# Patient Record
Sex: FEMALE | Race: WHITE | NOT HISPANIC OR LATINO | Employment: OTHER | URBAN - METROPOLITAN AREA
[De-identification: names, ages, dates, MRNs, and addresses within clinical notes are randomized per-mention and may not be internally consistent; named-entity substitution may affect disease eponyms.]

---

## 2019-12-10 PROBLEM — M48.061 DEGENERATIVE LUMBAR SPINAL STENOSIS: Status: ACTIVE | Noted: 2019-12-10

## 2019-12-10 PROBLEM — M43.16 SPONDYLOLISTHESIS OF LUMBAR REGION: Status: ACTIVE | Noted: 2019-12-10

## 2019-12-10 PROBLEM — M16.12 PRIMARY OSTEOARTHRITIS OF LEFT HIP: Status: ACTIVE | Noted: 2019-12-10

## 2019-12-10 PROBLEM — M54.16 LUMBAR RADICULOPATHY, RIGHT: Status: ACTIVE | Noted: 2019-12-10

## 2021-01-12 DIAGNOSIS — Z23 NEED FOR VACCINATION: ICD-10-CM

## 2021-03-24 ENCOUNTER — HOSPITAL ENCOUNTER (OUTPATIENT)
Dept: RADIOLOGY | Facility: MEDICAL CENTER | Age: 81
End: 2021-03-24
Payer: MEDICARE

## 2021-03-24 ENCOUNTER — PATIENT OUTREACH (OUTPATIENT)
Dept: OTHER | Facility: MEDICAL CENTER | Age: 81
End: 2021-03-24

## 2021-03-24 NOTE — PROGRESS NOTES
Telephone call to pt at 0900 to introduce navigation and enquire about barriers to care.  0955 Pt returned call.  Pt denied questions and barriers.  Stated she does not have a need for transportation.  Pt confirmed she will come for her consult appointment on Friday.

## 2021-03-26 ENCOUNTER — HOSPITAL ENCOUNTER (OUTPATIENT)
Dept: RADIATION ONCOLOGY | Facility: MEDICAL CENTER | Age: 81
End: 2021-03-31
Attending: RADIOLOGY
Payer: MEDICARE

## 2021-03-26 VITALS
BODY MASS INDEX: 24.43 KG/M2 | HEART RATE: 86 BPM | TEMPERATURE: 98.2 F | OXYGEN SATURATION: 93 % | WEIGHT: 146.61 LBS | HEIGHT: 65 IN | SYSTOLIC BLOOD PRESSURE: 122 MMHG | DIASTOLIC BLOOD PRESSURE: 82 MMHG

## 2021-03-26 DIAGNOSIS — C34.32 PRIMARY CANCER OF LEFT LOWER LOBE OF LUNG (HCC): ICD-10-CM

## 2021-03-26 PROCEDURE — 99214 OFFICE O/P EST MOD 30 MIN: CPT | Performed by: RADIOLOGY

## 2021-03-26 PROCEDURE — 99205 OFFICE O/P NEW HI 60 MIN: CPT | Performed by: RADIOLOGY

## 2021-03-26 ASSESSMENT — PAIN SCALES - GENERAL: PAINLEVEL: NO PAIN

## 2021-03-26 ASSESSMENT — FIBROSIS 4 INDEX: FIB4 SCORE: 3.39

## 2021-03-26 NOTE — CT SIMULATION
PATIENT NAME Melina Sánchez   PRIMARY PHYSICIAN Helena Bennett [id number]   REFERRING PHYSICIAN Johan Fong M.D. 1940     Primary cancer of left lower lobe of lung (HCC)  Staging form: Lung, AJCC 8th Edition  - Clinical stage from 3/26/2021: Stage 0 (cTis, cN0, cM0) - Signed by Isaiah GALLEGO M.D. on 3/26/2021  Histopathologic type: Adenocarcinoma in situ, NOS  Stage prefix: Initial diagnosis  Laterality: Left  Diagnostic confirmation: Radiography and other imaging techniques without microscopic confirmation  Type of lung cancer: Resectable non-small cell lung cancer in inoperable patients treated with radiotherapy  Karnofsky performance status: Score 100  Symptoms: Absent         Treatment Planning CT Simulation      Order Questions     Question Answer Comment    Is this for a new course of treatment? Yes     Is this an Addendum? No     Implanted Device/Pacemaker Yes     Simulation Status Initial     Planned Start Date 4/6/2021     Treatment Site Lung - Lower lobe     Laterality Left     Treatment Technique SBRT     Treatment Pattern/Frequency Daily     Concurrent Chemotherapy No     CT Technique 3D      4D     Slice Thickness 2mm     Scan Extent Chest     Treatment Device(s) OmniBoard     Patient Attire Gown     Patient Position Supine     Patient Orientation Head First     Arm Position Up     Treatment Machine TB1 - STx     Image Guidance Match PTV - Soft Tissue      Bone     Fiducial Tracking Fluoro     Treatment Planning Image Fusion CT/CT      CT/PET     Special Physics Consult Stereotactic     Other Orders Special Tx Procedure     Release to patient Immediate     Note:  This was built due to interface errors that were caused by using the Epic released question

## 2021-03-26 NOTE — NON-PROVIDER
"Patient was seen today in clinic with Dr. Rebollar for consult.  Vitals signs and weight were obtained and pain assessment was completed.  Allergies and medications were reviewed with the patient.  Toxicities of treatment assessed.     Vitals/Pain:  Vitals:    03/26/21 1029   BP: 122/82   Pulse: 86   Temp: 36.8 °C (98.2 °F)   SpO2: 93%   Weight: 66.5 kg (146 lb 9.7 oz)   Height: 1.651 m (5' 5\")   Pain Score: No pain        Allergies:   Sulfa drugs    Current Medications:  Current Outpatient Medications   Medication Sig Dispense Refill   • ELIQUIS 5 MG Tab TK 1 T PO BID     • metFORMIN (GLUCOPHAGE) 500 MG Tab Take 500 mg by mouth every day.     • metoprolol (LOPRESSOR) 50 MG Tab Take 25 mg by mouth 2 times a day.     • flecainide (TAMBOCOR) 50 MG tablet Take 50 mg by mouth 2 times a day.     • aspirin (ASA) 81 MG Chew Tab chewable tablet Take 81 mg by mouth every day.     • azithromycin (ZITHROMAX) 250 MG Tab Take 2 tabs on day one, then one tab on days 2-5. (Patient not taking: Reported on 3/26/2021) 6 Tab 0   • citalopram (CELEXA) 20 MG Tab Take 20 mg by mouth every day.     • rosuvastatin (CRESTOR) 20 MG Tab Take 20 mg by mouth every evening.       No current facility-administered medications for this encounter.         PCP:  Donald Conley, David Ass't  "

## 2021-03-26 NOTE — CONSULTS
RADIATION ONCOLOGY CONSULT    DATE OF SERVICE: 3/26/2021    IDENTIFICATION: A 80 y.o. female with   Primary cancer of left lower lobe of lung (HCC)  Staging form: Lung, AJCC 8th Edition  - Clinical stage from 3/26/2021: Stage 0 (cTis, cN0, cM0) - Signed by Isaiah GALLEGO M.D. on 3/26/2021  Histopathologic type: Adenocarcinoma in situ, NOS  Stage prefix: Initial diagnosis  Laterality: Left  Diagnostic confirmation: Radiography and other imaging techniques without microscopic confirmation  Type of lung cancer: Resectable non-small cell lung cancer in inoperable patients treated with radiotherapy  Karnofsky performance status: Score 100  Symptoms: Absent        She is here at the kind request of Dr. Fong to discuss stereotactic radiotherapy.    HISTORY OF PRESENT ILLNESS: 80-year-old female, former smoker, 25-pack-year tobacco history stopped in 1986.She presents with enlarging left lower lobe mass.  In May 2020 it was noted to be 1 cm in size.  In February 2021 and increased to 2 cm in size.PET CT scan showed metabolic activity with an SUV of 3.3.Bronchoscopy was performed 3/12/2021. Pathology demonstrated bronchial epithelial cells and alveolar macrophages with no evidence of malignancy on both washings and brushings.    Patient was felt to be a poor candidate for surgical resection.  Considering the growth in tumor and metabolic activity she has been referred here for empiric stereotactic radiotherapy.    She is currently asymptomatic.    PROBLEM LIST:  Patient Active Problem List   Diagnosis   • Degenerative lumbar spinal stenosis   • Spondylolisthesis of lumbar region   • Primary osteoarthritis of left hip   • Lumbar radiculopathy, right   • Primary cancer of left lower lobe of lung (HCC)        PAST SURGICAL HISTORY:  Past Surgical History:   Procedure Laterality Date   • PACEMAKER INSERTION  2009   • STENT PLACEMENT  2007   • APPENDECTOMY     • PRIMARY C SECTION         CURRENT MEDICATIONS:  Current  "Outpatient Medications   Medication Sig Dispense Refill   • ELIQUIS 5 MG Tab TK 1 T PO BID     • metFORMIN (GLUCOPHAGE) 500 MG Tab Take 500 mg by mouth every day.     • metoprolol (LOPRESSOR) 50 MG Tab Take 25 mg by mouth 2 times a day.     • flecainide (TAMBOCOR) 50 MG tablet Take 50 mg by mouth 2 times a day.     • aspirin (ASA) 81 MG Chew Tab chewable tablet Take 81 mg by mouth every day.     • azithromycin (ZITHROMAX) 250 MG Tab Take 2 tabs on day one, then one tab on days 2-5. (Patient not taking: Reported on 3/26/2021) 6 Tab 0   • citalopram (CELEXA) 20 MG Tab Take 20 mg by mouth every day.     • rosuvastatin (CRESTOR) 20 MG Tab Take 20 mg by mouth every evening.       No current facility-administered medications for this encounter.       ALLERGIES:    Sulfa drugs    FAMILY HISTORY:    family history includes Cancer in her brother and paternal grandmother.    SOCIAL HISTORY:     reports that she quit smoking about 35 years ago. Her smoking use included cigarettes. She has a 10.00 pack-year smoking history. She has never used smokeless tobacco. She reports current alcohol use of about 0.6 oz of alcohol per week. She reports that she does not use drugs.    REVIEW OF SYSTEMS:    A complete review of systems taken. Pertinent items in HPI.     PHYSICAL EXAM:    PERFORMANCE STATUS:  No flowsheet data found.  Karnofsky Score 3/26/2021   Karnofsky Score 80   Some recent data might be hidden     /82   Pulse 86   Temp 36.8 °C (98.2 °F)   Ht 1.651 m (5' 5\")   Wt 66.5 kg (146 lb 9.7 oz)   SpO2 93%   BMI 24.40 kg/m²   Physical Exam  Constitutional:       Appearance: Normal appearance.   HENT:      Head: Normocephalic.   Cardiovascular:      Rate and Rhythm: Normal rate and regular rhythm.      Heart sounds: Normal heart sounds.   Pulmonary:      Effort: Pulmonary effort is normal.      Breath sounds: Normal breath sounds.   Abdominal:      General: Abdomen is flat.   Musculoskeletal:      Right lower leg: No " edema.      Left lower leg: No edema.   Skin:     General: Skin is warm and dry.   Neurological:      General: No focal deficit present.      Mental Status: She is alert.   Psychiatric:         Mood and Affect: Mood normal.         Behavior: Behavior normal.         Thought Content: Thought content normal.         Judgment: Judgment normal.          LABORATORY DATA:   Lab Results   Component Value Date/Time    WBC 9.2 12/15/2020 01:05 PM    RBC 4.24 12/15/2020 01:05 PM    HEMOGLOBIN 13.4 12/15/2020 01:05 PM    HEMATOCRIT 42.0 12/15/2020 01:05 PM    MCV 99.1 (H) 12/15/2020 01:05 PM    MCH 31.6 (H) 12/15/2020 01:05 PM    MCHC 31.9 (L) 12/15/2020 01:05 PM    RDW 13.0 12/15/2020 01:05 PM    PLATELETCT 125 (L) 12/15/2020 01:05 PM    MPV 10.2 12/15/2020 01:05 PM      Lab Results   Component Value Date/Time    SODIUM 140 12/15/2020 01:05 PM    POTASSIUM 4.2 12/15/2020 01:05 PM    CHLORIDE 105 12/15/2020 01:05 PM    CO2 26 12/15/2020 01:05 PM    GLUCOSE 119 (H) 12/15/2020 01:05 PM    BUN 27 (H) 12/15/2020 01:05 PM    CREATININE 1.5 (H) 12/15/2020 01:05 PM           RADIOLOGY DATA:      IMPRESSION:    A 80 y.o. with  Primary cancer of left lower lobe of lung (HCC)  Staging form: Lung, AJCC 8th Edition  - Clinical stage from 3/26/2021: Stage 0 (cTis, cN0, cM0) - Signed by Isaiah GALLEGO M.D. on 3/26/2021  Histopathologic type: Adenocarcinoma in situ, NOS  Stage prefix: Initial diagnosis  Laterality: Left  Diagnostic confirmation: Radiography and other imaging techniques without microscopic confirmation  Type of lung cancer: Resectable non-small cell lung cancer in inoperable patients treated with radiotherapy  Karnofsky performance status: Score 100  Symptoms: Absent        RECOMMENDATIONS:   Reviewed imaging with patient and her son who accompanied her today.  We discussed the pros and cons of empiric stereotactic based therapy for what appears to be an adenocarcinoma in situ of the left lower lobe.Her alternative  options include continued observation; however, given the growth of the lesion patient is very reticent.  We also discussed surgical resection but she felt uncomfortable with potential risks associated with surgery.    With stereotactic based therapy we will not have direct evidence that this is cancer however using radiographic criteria, I feel fairly confident that were dealing with a low-grade malignancy.  In addition, stereotactic radiotherapy would provide very high local control rates greater than 90% with very little to no adverse effects.    After discussions she would like to proceed with stereotactic radiotherapy which will involve delivering 6000 cGy in 5 fractions given every other day over 1.5 weeks to the left lower lobe mass.  She will return for simulation on March 30 with treatment anticipated to start April 6 and complete by April 16.    Thank you for the opportunity to participate in her care.  If any questions or comments, please do not hesitate in calling.    Orders Placed This Encounter   • Rad Onc Treatment Planning CT Simulation

## 2021-03-30 ENCOUNTER — HOSPITAL ENCOUNTER (OUTPATIENT)
Dept: RADIATION ONCOLOGY | Facility: MEDICAL CENTER | Age: 81
End: 2021-03-30

## 2021-03-30 PROCEDURE — 77263 THER RADIOLOGY TX PLNG CPLX: CPT | Performed by: RADIOLOGY

## 2021-03-30 PROCEDURE — 77290 THER RAD SIMULAJ FIELD CPLX: CPT | Mod: 26 | Performed by: RADIOLOGY

## 2021-03-30 PROCEDURE — 77290 THER RAD SIMULAJ FIELD CPLX: CPT | Performed by: RADIOLOGY

## 2021-03-30 PROCEDURE — 77470 SPECIAL RADIATION TREATMENT: CPT | Performed by: RADIOLOGY

## 2021-03-30 PROCEDURE — 77334 RADIATION TREATMENT AID(S): CPT | Mod: 26 | Performed by: RADIOLOGY

## 2021-03-30 PROCEDURE — 77334 RADIATION TREATMENT AID(S): CPT | Performed by: RADIOLOGY

## 2021-03-30 PROCEDURE — 77470 SPECIAL RADIATION TREATMENT: CPT | Mod: 26 | Performed by: RADIOLOGY

## 2021-03-31 ENCOUNTER — PATIENT MESSAGE (OUTPATIENT)
Dept: HEALTH INFORMATION MANAGEMENT | Facility: OTHER | Age: 81
End: 2021-03-31

## 2021-03-31 ENCOUNTER — PATIENT OUTREACH (OUTPATIENT)
Dept: OTHER | Facility: MEDICAL CENTER | Age: 81
End: 2021-03-31

## 2021-03-31 PROCEDURE — 77370 RADIATION PHYSICS CONSULT: CPT | Performed by: RADIOLOGY

## 2021-03-31 NOTE — PROGRESS NOTES
"Cancer Nurse Navigation Assessment/follow up:      Assessment/Discussion:    Barriers to Care:  TRANSPORTATION:  Pt will drive herself.  She is a little concerned that she may be too tired to drive.  Reviewed side effects of radiation and that her driving should be ok.  EMPLOYMENT: Pt retired  FINANCIAL: Pt denies current financial stressors  INSURANCE:  pt insured  SUPPORT SYSTEM:  No one local.  Pt reported moving just prior to start of COVID and has not had opportunity to meet anyone in the area.  Pt reports does have family for support as well as her friends.  PSYCHOLOGICAL:  Pt very anxious regarding \"cancer diagnosis\".  Pt stating has had difficulty with sleeping past 2-3 weeks related to this.  Discussed with patient benefit of counseling, support groups as well as guided imagery(mind body techniques) classes.  Pt not sure but open to navigator sending information.  Talked to patient about bedtime routines and ways to aid sleep.  Encouraged her to discuss with physician if continues to be a concern and affect her quality of life.  Pt also reporting difficulty with remaining still when radiation mapping was done. Will discuss with therapists to see if this may be a barrier to first treatment.     DST: previously administered  COMMUNICATION:   no barriers  FAMILY CARE: none identified  SELF CARE:  no barriers identified    Resources Provided/Intervention:  Will mail information on counseling, support groups and guided imagery.  Provided additional education regarding treatment and what to expect.  Will send contact information via [employer] per her request.    Outcome:  Pt reports feeling a little better after discussion with navigator.          "

## 2021-04-01 ENCOUNTER — HOSPITAL ENCOUNTER (OUTPATIENT)
Dept: RADIATION ONCOLOGY | Facility: MEDICAL CENTER | Age: 81
End: 2021-04-30
Attending: RADIOLOGY
Payer: MEDICARE

## 2021-04-01 NOTE — RADIATION PLANNING NOTES
DATE OF SERVICE: 3/30/2021    DIAGNOSIS:  Primary cancer of left lower lobe of lung (HCC)  Staging form: Lung, AJCC 8th Edition  - Clinical stage from 3/26/2021: Stage 0 (cTis, cN0, cM0) - Signed by Isaiah GALLEGO M.D. on 3/26/2021  Histopathologic type: Adenocarcinoma in situ, NOS  Stage prefix: Initial diagnosis  Laterality: Left  Diagnostic confirmation: Radiography and other imaging techniques without microscopic confirmation  Type of lung cancer: Resectable non-small cell lung cancer in inoperable patients treated with radiotherapy  Karnofsky performance status: Score 100  Symptoms: Absent       DATE OF SERVICE: 3/30/2021    TYPE OF SIMULATION: SBRT    GOAL OF TREATMENT:   [] Curative  [] Palliative  [] Oligometastatic    CONTRAST:    [] IV Contrast*  [] Oral Contrast               POSITION:    []  Supine  [] Prone     IMMOBILIZATION DEVICE: []  Body-Fix  [] Omniboard  []  Bellyboard    PROCEDURE: Patient placed in immobilization device. 4D gated and non-gated CT obtained to assess organ motion.  Images viewed and approved.  Images reconstructed as need.  Image data sets transferred to [employer] for planning.    I have personally reviewed the relevant data, performed the target localization, and determined all relevant factors for this patient’s simulation.    *Omnipaque 80 -100cc IVP in conjunction with 500cc NS

## 2021-04-01 NOTE — RADIATION PLANNING NOTES
Clinical Treatment Planning Note    DATE OF SERVICE: 3/30/2021    DIAGNOSIS:  Primary cancer of left lower lobe of lung (HCC)  Staging form: Lung, AJCC 8th Edition  - Clinical stage from 3/26/2021: Stage 0 (cTis, cN0, cM0) - Signed by Isaiah GALLEGO M.D. on 3/26/2021  Histopathologic type: Adenocarcinoma in situ, NOS  Stage prefix: Initial diagnosis  Laterality: Left  Diagnostic confirmation: Radiography and other imaging techniques without microscopic confirmation  Type of lung cancer: Resectable non-small cell lung cancer in inoperable patients treated with radiotherapy  Karnofsky performance status: Score 100  Symptoms: Absent         IMAGING REVIEWED:  [x] CT     [] MRI     [x] PET/CT     [] BONE SCAN     [] MAMMO     [] OTHER      TREATMENT INTENT:   [x] CURATIVE     [] MAINTENANCE     []  PALLIATIVE      []  SUPPORTIVE     []  PROPHYLACTIC     [] BENIGN     []  CONSOLIDATIVE      [] DEFINITIVE   []  OLOGIMETASTATIC      LINE OF TREATMENT:  [] ADJUVANT   [x] DEFINITIVE   [] NEOADJUVANT   [] RE-TREATMENT      TECHNIQUE PLANNED:  [] IMRT   [] 3D   [x] SBRT   [] SRS/SRT   [] HDR   [] ELECTRON       IMRT JUSTIFICATION:  []   An immediately adjacent area has been previously irradiated and abutting portals must be established with high precision.    []  Dose escalation is planned to deliver radiation doses in excess of those commonly utilized for similar tumors with conventional treatment.    []  The target volume is concave or convex, and the critical normal tissues are within or around that convexity or concavity.    []  The target volume is in close proximity to critical structures that must be protected.    []  The volume of interest must be covered with narrow margins to adequately protect  immediately adjacent structures.      FIELDS & BLOCKING:  [] COMPLEX BLOCKS     []  = 3 TX AREAS     [x]  ARCS     []  CUSTOM SHEILD        []  SIMPLE BLOCK      CHEMOTHERAPY:  []  CONCURRENT     []  INDUCTION     []  SEQUENTIAL     []  <30 DAYS FROM XRT      NOTES:  OAR CONSTRAINTS: (GUIDELINES ONLY NOT ABSOLUTE)   QUANTEC OR AS STATED  Critical Structure Dose/fx Volume Dose Max Dose Protocol   Brachial Plexus 10-12 Gy x5 3 cc 6 Gy/fx  813   Brachial Plexus 10-12 Gy x5   6.4 Gy/fx 813   Brachial Plexus 20 Gy x3   8 Gy/fx 618   Bronchus/Trachea 10-12 Gy x5 4cc 3.6 Gy/fx 105%    Bronchus/Trachea 10-12 Gy x5   105%    Bronchus/Trachea 20 Gy x3   8 Gy/fx 618   Esophagus, nonadjacent wall 10-12 Gy x5 5 cc 5.5 Gy/fx  813   Esophagus, nonadjacent wall 10-12 Gy x5   105%    Esophagus 20 Gy x3   9 Gy/fx 618   Great Vessels 10-12 Gy x5 10 cc 9.4 Gy/fx  813   Great Vessels 10-12 Gy x5   105%    Heart 10-12 Gy x5 15 cc 5.5 Gy/fx  813   Heart 10-12 Gy x5   105%    Heart 20 Gy x3   8 Gy/fx 618   Lungs, total 10-12 Gy x5 1500 cc 2.5 Gy/fx  813   Lungs, total 10-12 Gy x5 1000 cc 2.7 Gy/fx  813   Lungs, total 20 Gy x3 V20 10% 8 Gy/fx 618   Skin 10-12 Gy x5 10 cc 6 Gy/fx  813   Skin 10-12 Gy x5   6.4 Gy/fx 813   Skin 20 Gy x3   8 Gy/fx 618   Spinal Cord 10-12 Gy x5 0.25 cc 4.5 Gy/fx  813   Spinal Cord 10-12 Gy x5 0.5 cc 2.7 Gy/fx  813   Spinal Cord 10-12 Gy x5   6 Gy/fx 813   Spinal Cord 12 Gy x4 0.35 cc 5.2 Gy/fx  915   Spinal Cord 12 Gy x4 1.2 cc 3.4 Gy/fx  915   Spinal Cord 20 Gy x3   6 Gy/fx 618

## 2021-04-01 NOTE — RADIATION PLANNING NOTES
PATIENT NAME Melina Sánchez   PRIMARY PHYSICIAN Helena Bennett [place] [id number]   REFERRING PHYSICIAN No ref. provider found 1940     DATE OF SERVICE: 3/30/2021    DIAGNOSIS:  Primary cancer of left lower lobe of lung (HCC)  Staging form: Lung, AJCC 8th Edition  - Clinical stage from 3/26/2021: Stage 0 (cTis, cN0, cM0) - Signed by Isaiah GALLEGO M.D. on 3/26/2021  Histopathologic type: Adenocarcinoma in situ, NOS  Stage prefix: Initial diagnosis  Laterality: Left  Diagnostic confirmation: Radiography and other imaging techniques without microscopic confirmation  Type of lung cancer: Resectable non-small cell lung cancer in inoperable patients treated with radiotherapy  Karnofsky performance status: Score 100  Symptoms: Absent         SPECIAL TREATMENT PROCEDURE NOTE:  Considerable additional effort required in the management of this case because of administration of Stereotactic Radiotherapy, which may result in increased normal tissue toxicity and require greater effort in contouring and treatment because of greater precision.

## 2021-04-02 PROCEDURE — 77300 RADIATION THERAPY DOSE PLAN: CPT | Mod: 26 | Performed by: RADIOLOGY

## 2021-04-02 PROCEDURE — 77334 RADIATION TREATMENT AID(S): CPT | Performed by: RADIOLOGY

## 2021-04-02 PROCEDURE — 77293 RESPIRATOR MOTION MGMT SIMUL: CPT | Mod: 26 | Performed by: RADIOLOGY

## 2021-04-02 PROCEDURE — 77295 3-D RADIOTHERAPY PLAN: CPT | Mod: 26 | Performed by: RADIOLOGY

## 2021-04-02 PROCEDURE — 77295 3-D RADIOTHERAPY PLAN: CPT | Performed by: RADIOLOGY

## 2021-04-02 PROCEDURE — 77293 RESPIRATOR MOTION MGMT SIMUL: CPT | Performed by: RADIOLOGY

## 2021-04-02 PROCEDURE — 77334 RADIATION TREATMENT AID(S): CPT | Mod: 26 | Performed by: RADIOLOGY

## 2021-04-02 PROCEDURE — 77300 RADIATION THERAPY DOSE PLAN: CPT | Performed by: RADIOLOGY

## 2021-04-06 ENCOUNTER — PATIENT OUTREACH (OUTPATIENT)
Dept: OTHER | Facility: MEDICAL CENTER | Age: 81
End: 2021-04-06

## 2021-04-06 ENCOUNTER — HOSPITAL ENCOUNTER (OUTPATIENT)
Dept: RADIATION ONCOLOGY | Facility: MEDICAL CENTER | Age: 81
End: 2021-04-06

## 2021-04-06 LAB
CHEMOTHERAPY INFUSION START DATE: NORMAL
CHEMOTHERAPY RECORDS: 12
CHEMOTHERAPY RECORDS: 6000
CHEMOTHERAPY RECORDS: NORMAL
CHEMOTHERAPY RX CANCER: NORMAL
DATE 1ST CHEMO CANCER: NORMAL
RAD ONC ARIA COURSE LAST TREATMENT DATE: NORMAL
RAD ONC ARIA COURSE TREATMENT ELAPSED DAYS: NORMAL
RAD ONC ARIA REFERENCE POINT DOSAGE GIVEN TO DATE: 12
RAD ONC ARIA REFERENCE POINT DOSAGE GIVEN TO DATE: 12.56
RAD ONC ARIA REFERENCE POINT ID: NORMAL
RAD ONC ARIA REFERENCE POINT ID: NORMAL
RAD ONC ARIA REFERENCE POINT SESSION DOSAGE GIVEN: 12
RAD ONC ARIA REFERENCE POINT SESSION DOSAGE GIVEN: 12.56

## 2021-04-06 PROCEDURE — 77280 THER RAD SIMULAJ FIELD SMPL: CPT | Mod: 26 | Performed by: RADIOLOGY

## 2021-04-06 PROCEDURE — 77373 STRTCTC BDY RAD THER TX DLVR: CPT | Performed by: RADIOLOGY

## 2021-04-06 PROCEDURE — 77435 SBRT MANAGEMENT: CPT | Performed by: RADIOLOGY

## 2021-04-06 PROCEDURE — 77280 THER RAD SIMULAJ FIELD SMPL: CPT | Performed by: RADIOLOGY

## 2021-04-06 NOTE — PROCEDURES
DATE OF SERVICE: 4/6/2021    DIAGNOSIS:  Primary cancer of left lower lobe of lung (HCC)  Staging form: Lung, AJCC 8th Edition  - Clinical stage from 3/26/2021: Stage 0 (cTis, cN0, cM0) - Signed by Isaiah GALLEGO M.D. on 3/26/2021  Histopathologic type: Adenocarcinoma in situ, NOS  Stage prefix: Initial diagnosis  Laterality: Left  Diagnostic confirmation: Radiography and other imaging techniques without microscopic confirmation  Type of lung cancer: Resectable non-small cell lung cancer in inoperable patients treated with radiotherapy  Karnofsky performance status: Score 100  Symptoms: Absent       TREATMENT:  Radiation Therapy Episodes     Active Episodes     Radiation Therapy: SBRT (4/6/2021)               Radiation Treatments       Plan Last Treated On Elapsed Days Fractions Treated Prescribed Fraction Dose (cGy) Prescribed Total Dose (cGy)    L Lung SBRT 4/6/2021 0 @ [id number] 1 of 5 1,200 6,000                Reference Point Last Treated On Elapsed Days Most Recent Session Dose (cGy) Total Dose (cGy)    ITV 4/6/2021 0 @ [id number] 1,200 1,200    L Lung cp 4/6/2021 0 @ [id number] 1,256 1,256                      STEREOTACTIC PROCEDURE NOTE:    Called by [employer] machine to verify treatment parameters including:  treatment site, treatment dose, and treatment setup prior to stereotactic treatment..    Patient was placed in the treatment position with use of immobilization device and  laser guidance. CBCT images were acquired for target localization.  Images were reviewed in the axial, coronal, and saggital views and shifts were made as necessary to ensure that patient position matched simulation position.      Treatment delivered per  prescription.  The medical physicist was present throughout the set-up, verification and treatment delivery to oversee the procedure and ensure all parameters agreed with the computerized plan.    I have personally reviewed the relevant data, performed the target  localization, and determined all relevant factors for this patient’s simulation.

## 2021-04-08 ENCOUNTER — HOSPITAL ENCOUNTER (OUTPATIENT)
Dept: RADIATION ONCOLOGY | Facility: MEDICAL CENTER | Age: 81
End: 2021-04-08

## 2021-04-08 LAB
CHEMOTHERAPY INFUSION START DATE: NORMAL
CHEMOTHERAPY RECORDS: 12
CHEMOTHERAPY RECORDS: 6000
CHEMOTHERAPY RECORDS: NORMAL
CHEMOTHERAPY RX CANCER: NORMAL
DATE 1ST CHEMO CANCER: NORMAL
RAD ONC ARIA COURSE LAST TREATMENT DATE: NORMAL
RAD ONC ARIA COURSE TREATMENT ELAPSED DAYS: NORMAL
RAD ONC ARIA REFERENCE POINT DOSAGE GIVEN TO DATE: 24
RAD ONC ARIA REFERENCE POINT DOSAGE GIVEN TO DATE: 25.12
RAD ONC ARIA REFERENCE POINT ID: NORMAL
RAD ONC ARIA REFERENCE POINT ID: NORMAL
RAD ONC ARIA REFERENCE POINT SESSION DOSAGE GIVEN: 12
RAD ONC ARIA REFERENCE POINT SESSION DOSAGE GIVEN: 12.56

## 2021-04-08 PROCEDURE — 77373 STRTCTC BDY RAD THER TX DLVR: CPT | Performed by: RADIOLOGY

## 2021-04-08 PROCEDURE — 77280 THER RAD SIMULAJ FIELD SMPL: CPT | Mod: 26 | Performed by: RADIOLOGY

## 2021-04-08 PROCEDURE — 77280 THER RAD SIMULAJ FIELD SMPL: CPT | Performed by: RADIOLOGY

## 2021-04-08 NOTE — PROCEDURES
DATE OF SERVICE: 4/8/2021    DIAGNOSIS:  Primary cancer of left lower lobe of lung (HCC)  Staging form: Lung, AJCC 8th Edition  - Clinical stage from 3/26/2021: Stage 0 (cTis, cN0, cM0) - Signed by Isaiah GALLEGO M.D. on 3/26/2021  Histopathologic type: Adenocarcinoma in situ, NOS  Stage prefix: Initial diagnosis  Laterality: Left  Diagnostic confirmation: Radiography and other imaging techniques without microscopic confirmation  Type of lung cancer: Resectable non-small cell lung cancer in inoperable patients treated with radiotherapy  Karnofsky performance status: Score 100  Symptoms: Absent       TREATMENT:  Radiation Therapy Episodes     Active Episodes     Radiation Therapy: SBRT (4/6/2021)               Radiation Treatments       Plan Last Treated On Elapsed Days Fractions Treated Prescribed Fraction Dose (cGy) Prescribed Total Dose (cGy)    L Lung SBRT 4/8/2021 2 @ [id number] 2 of 5 1,200 6,000                Reference Point Last Treated On Elapsed Days Most Recent Session Dose (cGy) Total Dose (cGy)    ITV 4/8/2021 2 @ [id number] 1,200 2,400    L Lung cp 4/8/2021 2 @ [id number] 1,256 2,512                      STEREOTACTIC PROCEDURE NOTE:    Called by [email or web address] machine to verify treatment parameters including:  treatment site, treatment dose, and treatment setup prior to stereotactic treatment..    Patient was placed in the treatment position with use of immobilization device and  laser guidance. CBCT images were acquired for target localization.  Images were reviewed in the axial, coronal, and saggital views and shifts were made as necessary to ensure that patient position matched simulation position.      Treatment delivered per  prescription.  The medical physicist was present throughout the set-up, verification and treatment delivery to oversee the procedure and ensure all parameters agreed with the computerized plan.    I have personally reviewed the relevant data, performed the target  localization, and determined all relevant factors for this patient’s simulation.

## 2021-04-12 ENCOUNTER — HOSPITAL ENCOUNTER (OUTPATIENT)
Dept: RADIATION ONCOLOGY | Facility: MEDICAL CENTER | Age: 81
End: 2021-04-12
Payer: MEDICARE

## 2021-04-12 LAB
CHEMOTHERAPY INFUSION START DATE: NORMAL
CHEMOTHERAPY RECORDS: 12
CHEMOTHERAPY RECORDS: 6000
CHEMOTHERAPY RECORDS: NORMAL
CHEMOTHERAPY RX CANCER: NORMAL
DATE 1ST CHEMO CANCER: NORMAL
RAD ONC ARIA COURSE LAST TREATMENT DATE: NORMAL
RAD ONC ARIA COURSE TREATMENT ELAPSED DAYS: NORMAL
RAD ONC ARIA REFERENCE POINT DOSAGE GIVEN TO DATE: 36
RAD ONC ARIA REFERENCE POINT DOSAGE GIVEN TO DATE: 37.68
RAD ONC ARIA REFERENCE POINT ID: NORMAL
RAD ONC ARIA REFERENCE POINT ID: NORMAL
RAD ONC ARIA REFERENCE POINT SESSION DOSAGE GIVEN: 12
RAD ONC ARIA REFERENCE POINT SESSION DOSAGE GIVEN: 12.56

## 2021-04-12 PROCEDURE — 77336 RADIATION PHYSICS CONSULT: CPT | Mod: XU | Performed by: RADIOLOGY

## 2021-04-12 PROCEDURE — 77280 THER RAD SIMULAJ FIELD SMPL: CPT | Mod: 26 | Performed by: RADIOLOGY

## 2021-04-12 PROCEDURE — 77280 THER RAD SIMULAJ FIELD SMPL: CPT | Performed by: RADIOLOGY

## 2021-04-12 PROCEDURE — 77373 STRTCTC BDY RAD THER TX DLVR: CPT | Performed by: RADIOLOGY

## 2021-04-14 ENCOUNTER — HOSPITAL ENCOUNTER (OUTPATIENT)
Dept: RADIATION ONCOLOGY | Facility: MEDICAL CENTER | Age: 81
End: 2021-04-14
Payer: MEDICARE

## 2021-04-14 VITALS
WEIGHT: 149.91 LBS | HEART RATE: 84 BPM | BODY MASS INDEX: 24.95 KG/M2 | SYSTOLIC BLOOD PRESSURE: 130 MMHG | TEMPERATURE: 97.2 F | OXYGEN SATURATION: 95 % | DIASTOLIC BLOOD PRESSURE: 83 MMHG

## 2021-04-14 DIAGNOSIS — C34.90 MALIGNANT NEOPLASM OF UNSPECIFIED PART OF UNSPECIFIED BRONCHUS OR LUNG (HCC): ICD-10-CM

## 2021-04-14 DIAGNOSIS — C34.32 PRIMARY CANCER OF LEFT LOWER LOBE OF LUNG (HCC): ICD-10-CM

## 2021-04-14 LAB
CHEMOTHERAPY INFUSION START DATE: NORMAL
CHEMOTHERAPY RECORDS: 12
CHEMOTHERAPY RECORDS: 6000
CHEMOTHERAPY RECORDS: NORMAL
CHEMOTHERAPY RX CANCER: NORMAL
DATE 1ST CHEMO CANCER: NORMAL
RAD ONC ARIA COURSE LAST TREATMENT DATE: NORMAL
RAD ONC ARIA COURSE TREATMENT ELAPSED DAYS: NORMAL
RAD ONC ARIA REFERENCE POINT DOSAGE GIVEN TO DATE: 48
RAD ONC ARIA REFERENCE POINT DOSAGE GIVEN TO DATE: 50.24
RAD ONC ARIA REFERENCE POINT ID: NORMAL
RAD ONC ARIA REFERENCE POINT ID: NORMAL
RAD ONC ARIA REFERENCE POINT SESSION DOSAGE GIVEN: 12
RAD ONC ARIA REFERENCE POINT SESSION DOSAGE GIVEN: 12.56

## 2021-04-14 PROCEDURE — 77373 STRTCTC BDY RAD THER TX DLVR: CPT | Performed by: RADIOLOGY

## 2021-04-14 PROCEDURE — 77280 THER RAD SIMULAJ FIELD SMPL: CPT | Mod: 26 | Performed by: RADIOLOGY

## 2021-04-14 PROCEDURE — 77280 THER RAD SIMULAJ FIELD SMPL: CPT | Performed by: RADIOLOGY

## 2021-04-14 ASSESSMENT — FIBROSIS 4 INDEX: FIB4 SCORE: 3.39

## 2021-04-14 ASSESSMENT — PAIN SCALES - GENERAL: PAINLEVEL: NO PAIN

## 2021-04-14 NOTE — ON TREATMENT VISIT
ON TREATMENT  NOTE  RADIATION ONCOLOGY DEPARTMENT    Patient name:  Melina Sánchez    Primary Physician:  Helena Bennett D.O. MRN: [id number]  [facility]: [id number]   Referring physician:  Johan Fong M.D. : 1940, 80 y.o.     ENCOUNTER DATE:  2021      DIAGNOSIS:  Primary cancer of left lower lobe of lung (HCC)  Staging form: Lung, AJCC 8th Edition  - Clinical stage from 3/26/2021: Stage 0 (cTis, cN0, cM0) - Signed by Isaiah GALLEGO M.D. on 3/26/2021  Histopathologic type: Adenocarcinoma in situ, NOS  Stage prefix: Initial diagnosis  Laterality: Left  Diagnostic confirmation: Radiography and other imaging techniques without microscopic confirmation  Type of lung cancer: Resectable non-small cell lung cancer in inoperable patients treated with radiotherapy  Karnofsky performance status: Score 100  Symptoms: Absent      TREATMENT SUMMARY:  Aria Treatment Information        Some values may be hidden. Unless noted otherwise, only the newest values recorded on each date are displayed.         Aria Treatment Summary 21   Course First Treatment Date 2021   Course Last Treatment Date 2021   L Lung SBRT Plan from Course C1_SBRT L lung   Fraction 4 of 5   Elapsed Course Days  @ [id number]   Prescribed Fraction Dose 1,200 cGy   Prescribed Total Dose 6,000 cGy   ITV Reference Point from Course C1_SBRT L lung   Elapsed Course Days  @ [id number]   Session Dose 1,200 cGy   Total Dose 4,800 cGy   L Lung cp Reference Point from Course C1_SBRT L lung   Elapsed Course Days  @ [id number]   Session Dose 1,256 cGy   Total Dose 5,024 cGy              SUBJECTIVE:  No complaints.      VITAL SIGNS:  Vitals:    21 0833   BP: 130/83   Pulse: 84   Temp: 36.2 °C (97.2 °F)   SpO2: 95%      KPS: 90, Able to carry on normal activity; minor signs or symptoms of disease (ECOG equivalent 0)  Encounter Vitals  Temperature: 36.2 °C (97.2 °F)  Blood Pressure : 130/83  Pulse: 84  Pulse Oximetry: 95 %  Weight:  68 kg (149 lb 14.6 oz)  Pain Score: No pain  Pain Assessment 4/14/2021 3/26/2021   Pain Score 0 0   Some recent data might be hidden          PHYSICAL EXAM:  Physical Exam  Vitals and nursing note reviewed.   Constitutional:       Appearance: She is well-developed.   HENT:      Head: Normocephalic.   Skin:     General: Skin is warm and dry.      Findings: No erythema.   Neurological:      Mental Status: She is alert and oriented to person, place, and time.   Psychiatric:         Behavior: Behavior normal.         Thought Content: Thought content normal.         Judgment: Judgment normal.          Toxicity Assessment 4/14/2021   Toxicity Assessment Chest   Fatigue (lethargy, malaise, asthenia) Increased fatigue over baseline, but not altering normal activities   Radiation Dermatitis None   Anorexia None   Dyspepsia/heartburn None   Dysphagia, Esophagitis, odynophagoa (painful swallowing) None   RT Dysphagia-Esophageal None   Cough Absent   Dyspnea Normal       CURRENT MEDICATIONS:    Current Outpatient Medications:   •  ELIQUIS 5 MG Tab, TK 1 T PO BID, Disp: , Rfl:   •  azithromycin (ZITHROMAX) 250 MG Tab, Take 2 tabs on day one, then one tab on days 2-5. (Patient not taking: Reported on 3/26/2021), Disp: 6 Tab, Rfl: 0  •  metFORMIN (GLUCOPHAGE) 500 MG Tab, Take 500 mg by mouth every day., Disp: , Rfl:   •  metoprolol (LOPRESSOR) 50 MG Tab, Take 25 mg by mouth 2 times a day., Disp: , Rfl:   •  flecainide (TAMBOCOR) 50 MG tablet, Take 50 mg by mouth 2 times a day., Disp: , Rfl:   •  citalopram (CELEXA) 20 MG Tab, Take 20 mg by mouth every day., Disp: , Rfl:   •  rosuvastatin (CRESTOR) 20 MG Tab, Take 20 mg by mouth every evening., Disp: , Rfl:   •  aspirin (ASA) 81 MG Chew Tab chewable tablet, Take 81 mg by mouth every day., Disp: , Rfl:     LABORATORY DATA:   Lab Results   Component Value Date/Time    SODIUM 140 12/15/2020 01:05 PM    POTASSIUM 4.2 12/15/2020 01:05 PM    CHLORIDE 105 12/15/2020 01:05 PM    CO2 26  12/15/2020 01:05 PM    GLUCOSE 119 (H) 12/15/2020 01:05 PM    BUN 27 (H) 12/15/2020 01:05 PM    CREATININE 1.5 (H) 12/15/2020 01:05 PM       Lab Results   Component Value Date/Time    WBC 9.2 12/15/2020 01:05 PM    RBC 4.24 12/15/2020 01:05 PM    HEMOGLOBIN 13.4 12/15/2020 01:05 PM    HEMATOCRIT 42.0 12/15/2020 01:05 PM    MCV 99.1 (H) 12/15/2020 01:05 PM    MCH 31.6 (H) 12/15/2020 01:05 PM    MCHC 31.9 (L) 12/15/2020 01:05 PM    PLATELETCT 125 (L) 12/15/2020 01:05 PM         RADIOLOGY DATA:  No results found.    IMPRESSION:  Cancer Staging  Primary cancer of left lower lobe of lung (HCC)  Staging form: Lung, AJCC 8th Edition  - Clinical stage from 3/26/2021: Stage 0 (cTis, cN0, cM0) - Signed by Isaiah GALLEGO M.D. on 3/26/2021      PLAN:  No change in treatment plan   RTC 8 weeks with imaging    Disposition:  Treatment plan and imaging reviewed. Questions answered. Continue therapy outlined.     Isaiah GALLEGO M.D.    No orders of the defined types were placed in this encounter.

## 2021-04-14 NOTE — PROCEDURES
DATE OF SERVICE: 4/14/2021    DIAGNOSIS:  Primary cancer of left lower lobe of lung (HCC)  Staging form: Lung, AJCC 8th Edition  - Clinical stage from 3/26/2021: Stage 0 (cTis, cN0, cM0) - Signed by Isaiah GALLEGO M.D. on 3/26/2021  Histopathologic type: Adenocarcinoma in situ, NOS  Stage prefix: Initial diagnosis  Laterality: Left  Diagnostic confirmation: Radiography and other imaging techniques without microscopic confirmation  Type of lung cancer: Resectable non-small cell lung cancer in inoperable patients treated with radiotherapy  Karnofsky performance status: Score 100  Symptoms: Absent       TREATMENT:  Radiation Therapy Episodes     Active Episodes     Radiation Therapy: SBRT (4/6/2021)               Radiation Treatments       Plan Last Treated On Elapsed Days Fractions Treated Prescribed Fraction Dose (cGy) Prescribed Total Dose (cGy)    L Lung SBRT 4/14/2021 8 @ [id number] 4 of 5 1,200 6,000                Reference Point Last Treated On Elapsed Days Most Recent Session Dose (cGy) Total Dose (cGy)    ITV 4/14/2021 8 @ [id number] 1,200 4,800    L Lung cp 4/14/2021 8 @ [id number] 1,256 5,024                      STEREOTACTIC PROCEDURE NOTE:    Called by Truebeam machine to verify treatment parameters including:  treatment site, treatment dose, and treatment setup prior to stereotactic treatment..    Patient was placed in the treatment position with use of immobilization device and  laser guidance. CBCT images were acquired for target localization.  Images were reviewed in the axial, coronal, and saggital views and shifts were made as necessary to ensure that patient position matched simulation position.      Treatment delivered per  prescription.  The medical physicist was present throughout the set-up, verification and treatment delivery to oversee the procedure and ensure all parameters agreed with the computerized plan.    I have personally reviewed the relevant data, performed the target  localization, and determined all relevant factors for this patient’s simulation.

## 2021-04-16 ENCOUNTER — HOSPITAL ENCOUNTER (OUTPATIENT)
Dept: RADIATION ONCOLOGY | Facility: MEDICAL CENTER | Age: 81
End: 2021-04-16

## 2021-04-16 LAB
CHEMOTHERAPY INFUSION START DATE: NORMAL
CHEMOTHERAPY RECORDS: 12
CHEMOTHERAPY RECORDS: 6000
CHEMOTHERAPY RECORDS: NORMAL
CHEMOTHERAPY RX CANCER: NORMAL
DATE 1ST CHEMO CANCER: NORMAL
RAD ONC ARIA COURSE LAST TREATMENT DATE: NORMAL
RAD ONC ARIA COURSE TREATMENT ELAPSED DAYS: NORMAL
RAD ONC ARIA REFERENCE POINT DOSAGE GIVEN TO DATE: 60
RAD ONC ARIA REFERENCE POINT DOSAGE GIVEN TO DATE: 62.8
RAD ONC ARIA REFERENCE POINT ID: NORMAL
RAD ONC ARIA REFERENCE POINT ID: NORMAL
RAD ONC ARIA REFERENCE POINT SESSION DOSAGE GIVEN: 12
RAD ONC ARIA REFERENCE POINT SESSION DOSAGE GIVEN: 12.56

## 2021-04-16 PROCEDURE — 77373 STRTCTC BDY RAD THER TX DLVR: CPT | Performed by: RADIOLOGY

## 2021-04-16 PROCEDURE — 77280 THER RAD SIMULAJ FIELD SMPL: CPT | Performed by: RADIOLOGY

## 2021-04-16 PROCEDURE — 77280 THER RAD SIMULAJ FIELD SMPL: CPT | Mod: 26 | Performed by: RADIOLOGY

## 2021-04-19 LAB
CHEMOTHERAPY INFUSION START DATE: NORMAL
CHEMOTHERAPY INFUSION STOP DATE: NORMAL
CHEMOTHERAPY RECORDS: 12
CHEMOTHERAPY RECORDS: 6000
CHEMOTHERAPY RECORDS: NORMAL
CHEMOTHERAPY RX CANCER: NORMAL
DATE 1ST CHEMO CANCER: NORMAL
RAD ONC ARIA COURSE LAST TREATMENT DATE: NORMAL
RAD ONC ARIA COURSE TREATMENT ELAPSED DAYS: NORMAL
RAD ONC ARIA REFERENCE POINT DOSAGE GIVEN TO DATE: 60
RAD ONC ARIA REFERENCE POINT DOSAGE GIVEN TO DATE: 62.8
RAD ONC ARIA REFERENCE POINT ID: NORMAL
RAD ONC ARIA REFERENCE POINT ID: NORMAL

## 2021-04-19 NOTE — RADIATION COMPLETION NOTES
END OF TREATMENT SUMMARY    Patient name:  Melina Sánchez    Primary Physician:  Helena Bennett D.O. MRN: [id number]  CSN: [id number]   Referring physician:  No ref. provider found : 1940, 80 y.o.                STAGE: Cancer Staging  Primary cancer of left lower lobe of lung (HCC)  Staging form: Lung, AJCC 8th Edition  - Clinical stage from 3/26/2021: Stage 0 (cTis, cN0, cM0) - Signed by Isaiah GALLEGO M.D. on 3/26/2021        TREATMENT INDICATION:   Early stage lung cancer treated with SBRT. No tissue dx.     COURSE START DATE:   Course First Treatment Date   Date Value Ref Range Status   2021  Final        COURSE END DATE:   Course Last Treatment Date   Date Value Ref Range Status   2021  Final          ELAPSED DAYS:   Course Elapsed Days   Date Value Ref Range Status   2021 10 @ [id number]  Final        INTENT:   Course Intent   Date Value Ref Range Status   2021 Curative  Final        CONCURRENT SYSTEMIC TREATMENT:   None     RT COURSE DISCONTINUED EARLY:   No     PATIENT EXPERIENCE:   Toxicity Assessment 2021   Toxicity Assessment Chest   Fatigue (lethargy, malaise, asthenia) Increased fatigue over baseline, but not altering normal activities   Radiation Dermatitis None   Anorexia None   Dyspepsia/heartburn None   Dysphagia, Esophagitis, odynophagoa (painful swallowing) None   RT Dysphagia-Esophageal None   Cough Absent   Dyspnea Normal          FOLLOW-UP PLAN:   8 Weeks with imaging     COMMENT:          ANATOMIC TARGET SUMMARY    ANATOMIC TARGET MODALITY TECHNIQUE   LLL   External beam, photons SBRT            COMMENT:     TREATMENT SUMMARY:  Aria Treatment Information        Some values may be hidden. Unless noted otherwise, only the newest values recorded on each date are displayed.         Aria Treatment Summary 21   Course First Treatment Date 2021   Course Last Treatment Date 2021   L Lung SBRT Plan from Course C1_SBRT L  lung   Fraction 5 of 5   Elapsed Course Days 10 @ [id number]   Prescribed Fraction Dose 1,200 cGy   Prescribed Total Dose 6,000 cGy   ITV Reference Point from Course C1_SBRT L lung   Elapsed Course Days 10 @ [id number]   Session Dose 1,200 cGy   Total Dose 6,000 cGy   L Lung cp Reference Point from Course C1_SBRT L lung   Elapsed Course Days 10 @ [id number]   Session Dose 1,256 cGy   Total Dose 6,280 cGy              DIAGRAMS:      DOSE VOLUME HISTOGRAMS:        Isaiah GALLEGO M.D.  Electronically signed by: Isaiah GALLEGO M.D., 4/19/2021 3:34 PM  [phone]

## 2021-04-27 ENCOUNTER — PATIENT OUTREACH (OUTPATIENT)
Dept: OTHER | Facility: MEDICAL CENTER | Age: 81
End: 2021-04-27

## 2021-04-27 NOTE — PROGRESS NOTES
"On April 27, 2021, Oncology Social Worker Isabela Alanis received telephone call from pt.  OSW Zeke explained her role.  Pt. shared she thinks she's \"doing okay, I don't know any better but I think I'm okay.\"  Pt. shared her only concern to be \"tired.\"  Pt. asked OSW Zeke if being tired was normal.  OSLESLEE Alanis encouraged pt. to contact doctor's office to let them know so they can tell her whether it's normal or not.  Pt. shared \"I can do that.\"   No other concerns at this time.  Pt. agreed to contact OSLESLEE Alanis if she needed any additional support.      "

## 2021-04-27 NOTE — PROGRESS NOTES
On April 27, 2021, Oncology Social Worker Isabela Alanis attempted telephone contact with pt.  OSW Zeke left voicemail message for pt. requesting pt. call back at earliest convenience.  OSW Zeke left contact information in voicemail message.

## 2021-06-14 PROBLEM — E11.9 CONTROLLED TYPE 2 DIABETES MELLITUS WITHOUT COMPLICATION, WITHOUT LONG-TERM CURRENT USE OF INSULIN (HCC): Status: ACTIVE | Noted: 2021-06-14

## 2021-06-18 ENCOUNTER — HOSPITAL ENCOUNTER (OUTPATIENT)
Dept: RADIOLOGY | Facility: MEDICAL CENTER | Age: 81
End: 2021-06-18
Payer: MEDICARE

## 2021-06-18 ENCOUNTER — HOSPITAL ENCOUNTER (OUTPATIENT)
Dept: RADIATION ONCOLOGY | Facility: MEDICAL CENTER | Age: 81
End: 2021-06-30
Attending: RADIOLOGY
Payer: MEDICARE

## 2021-06-18 PROBLEM — Z95.0 PACEMAKER: Status: ACTIVE | Noted: 2021-03-29

## 2021-06-18 PROBLEM — I25.10 CORONARY ARTERY DISEASE INVOLVING NATIVE CORONARY ARTERY OF NATIVE HEART WITHOUT ANGINA PECTORIS: Status: ACTIVE | Noted: 2021-03-29

## 2021-06-18 PROBLEM — I48.0 PAROXYSMAL ATRIAL FIBRILLATION (HCC): Status: ACTIVE | Noted: 2021-03-29

## 2021-06-18 PROBLEM — E11.9 TYPE 2 DIABETES MELLITUS WITHOUT COMPLICATION, WITHOUT LONG-TERM CURRENT USE OF INSULIN (HCC): Status: ACTIVE | Noted: 2021-03-29

## 2021-06-18 NOTE — PROGRESS NOTES
As a means of avoiding spread of COVID-19, this visit is being conducted by telephone. This telephone visit was initiated by the patient and they verbally consented.    Time at start of call: 10:30    Reason for Call:  Post Treatment Follow Up    HPI:    80-year-old female, former smoker, 25-pack-year tobacco history stopped in 1986.She presents with enlarging left lower lobe mass.  In May 2020 it was noted to be 1 cm in size.  In February 2021 and increased to 2 cm in size.PET CT scan showed metabolic activity with an SUV of 3.3.Bronchoscopy was performed 3/12/2021. Pathology demonstrated bronchial epithelial cells and alveolar macrophages with no evidence of malignancy on both washings and brushings.     Patient was felt to be a poor candidate for surgical resection.  Considering the growth in tumor and metabolic activity she has been referred here for empiric stereotactic radiotherapy.     She is currently asymptomatic.    Aria Treatment Information        Some values may be hidden. Unless noted otherwise, only the newest values recorded on each date are displayed.         Aria Treatment Summary 4/19/21   Course First Treatment Date 04/06/2021   Course Last Treatment Date 04/16/2021   L Lung SBRT Plan from Course C1_SBRT L lung   Fraction 5 of 5   Elapsed Course Days 10 @ [id number]   Prescribed Fraction Dose 1,200 cGy   Prescribed Total Dose 6,000 cGy   ITV Reference Point from Course C1_SBRT L lung   Elapsed Course Days 10 @ [id number]   Session Dose -   Total Dose 6,000 cGy   L Lung cp Reference Point from Course C1_SBRT L lung   Elapsed Course Days 10 @ [id number]   Session Dose -   Total Dose 6,280 cGy           INTERVAL HISTORY:  Telephone visit after approximately 8 weeks from completion of therapy with repeat CT chest. Primary complaint is fatigue. She has a mild cough. No significant dyspnea. Has resumed [employer] classes.    Labs / Images Reviewed:       Assessment and Plan:   Primary cancer of left  lower lobe of lung (HCC)  Staging form: Lung, AJCC 8th Edition  - Clinical stage from 3/26/2021: Stage 0 (cTis, cN0, cM0) - Signed by Isaiah GALLEGO M.D. on 3/26/2021  Histopathologic type: Adenocarcinoma in situ, NOS  Stage prefix: Initial diagnosis  Laterality: Left  Diagnostic confirmation: Radiography and other imaging techniques without microscopic confirmation  Type of lung cancer: Resectable non-small cell lung cancer in inoperable patients treated with radiotherapy  Karnofsky performance status: Score 100  Symptoms: Absent  Excellent response to SBRT.    Follow-up: Deferred follow-ups and imaging to  Dr. Fong, taking into consideration proximity    Time at end of call: 10:39    Total Time Spent:5-10 minutes    Isaiah GALLEGO M.D.

## 2021-07-09 ENCOUNTER — DOCUMENTATION (OUTPATIENT)
Dept: OTHER | Facility: MEDICAL CENTER | Age: 81
End: 2021-07-09

## 2021-07-09 NOTE — PROGRESS NOTES
Pt has not needed cancer nurse navigation.  Completed radiation and will be followed by Dr Fong now, will sign off.

## 2021-09-10 PROBLEM — E11.9 TYPE 2 DIABETES MELLITUS WITHOUT COMPLICATION, WITHOUT LONG-TERM CURRENT USE OF INSULIN (HCC): Status: RESOLVED | Noted: 2021-03-29 | Resolved: 2021-09-10

## 2022-05-16 PROBLEM — R91.8 LUNG NODULES: Status: ACTIVE | Noted: 2021-05-14

## 2023-03-14 PROBLEM — M65.30 TRIGGER FINGER OF LEFT HAND: Status: ACTIVE | Noted: 2023-03-14
